# Patient Record
Sex: MALE | Race: WHITE
[De-identification: names, ages, dates, MRNs, and addresses within clinical notes are randomized per-mention and may not be internally consistent; named-entity substitution may affect disease eponyms.]

---

## 2018-04-12 ENCOUNTER — HOSPITAL ENCOUNTER (OUTPATIENT)
Dept: HOSPITAL 75 - RAD | Age: 28
End: 2018-04-12
Attending: FAMILY MEDICINE
Payer: COMMERCIAL

## 2018-04-12 DIAGNOSIS — M25.511: Primary | ICD-10-CM

## 2018-04-12 PROCEDURE — 73030 X-RAY EXAM OF SHOULDER: CPT

## 2018-04-12 NOTE — DIAGNOSTIC IMAGING REPORT
INDICATION: Right shoulder pain.



TIME OF EXAM: 10:28 a.m.



Two views of the right shoulder were obtained. The humeral and

acromioclavicular alignment are normal. Acromiohumeral space is

normal. No fracture or dislocation is identified.



IMPRESSION: No acute bony abnormality is detected.



Dictated by: 



  Dictated on workstation # [id number]

## 2018-04-26 ENCOUNTER — HOSPITAL ENCOUNTER (OUTPATIENT)
Dept: HOSPITAL 75 - RAD | Age: 28
End: 2018-04-26
Attending: FAMILY MEDICINE
Payer: COMMERCIAL

## 2018-04-26 DIAGNOSIS — M24.811: Primary | ICD-10-CM

## 2018-04-26 PROCEDURE — 73221 MRI JOINT UPR EXTREM W/O DYE: CPT

## 2018-04-26 NOTE — DIAGNOSTIC IMAGING REPORT
PROCEDURE: MRI right joint upper extremity without contrast.



TECHNIQUE: Multiplanar, multisequence non contrast-enhanced MRI

of the right upper extremity was accomplished.



INDICATION: Right shoulder pain radiating to the neck and

posterior shoulder. Locking in the shoulder joint.



COMPARISON: Radiograph from 04/12/2018.



FINDINGS:



No acute fracture or dislocation is seen in the right shoulder.

Alignment appears normal. The joint spaces are generally

preserved. There is no significant glenohumeral joint effusion,

although there is fluid in the proximal long head of the biceps

tendon sheath. Trace fluid is seen in the subacromial-subdeltoid

bursa.



The rotator cuff tendons appear intact. There is mild edema in

the anterior supraspinatus musculature. No muscular atrophy is

seen.



The glenoid labrum is suboptimally evaluated in the absence of

contrast; however, no discrete tear is seen. No paralabral cysts

are identified.



The acromion demonstrates a curved undersurface without

significant downsloping or hooking. The coracoclavicular and

coracoacromial ligaments are intact. The soft tissues about the

right shoulder are otherwise unremarkable.



IMPRESSION:

1. Minimal edema in the anterior supraspinatus musculature, may

represent a low-grade strain. No rotator cuff tear is seen.

2. Minimal subacromial-subdeltoid bursitis.

3. Fluid in the proximal long head of the biceps tendon, without

symmetric fluid in the glenohumeral joint, may represent

tenosynovitis.



Dictated by: 



  Dictated on workstation # [id number]

## 2022-07-28 ENCOUNTER — HOSPITAL ENCOUNTER (OUTPATIENT)
Dept: HOSPITAL 75 - REHAB | Age: 32
LOS: 3 days | Discharge: HOME | End: 2022-07-31
Attending: NURSE PRACTITIONER
Payer: COMMERCIAL

## 2022-07-28 DIAGNOSIS — M67.834: Primary | ICD-10-CM

## 2022-08-09 ENCOUNTER — HOSPITAL ENCOUNTER (OUTPATIENT)
Dept: HOSPITAL 75 - REHAB | Age: 32
Discharge: HOME | End: 2022-08-09
Attending: NURSE PRACTITIONER
Payer: COMMERCIAL

## 2022-08-09 DIAGNOSIS — M67.834: Primary | ICD-10-CM
